# Patient Record
Sex: MALE | Race: WHITE | NOT HISPANIC OR LATINO | URBAN - METROPOLITAN AREA
[De-identification: names, ages, dates, MRNs, and addresses within clinical notes are randomized per-mention and may not be internally consistent; named-entity substitution may affect disease eponyms.]

---

## 2023-04-26 ENCOUNTER — NURSING HOME VISIT (OUTPATIENT)
Dept: POST ACUTE CARE | Facility: EXTERNAL LOCATION | Age: 88
End: 2023-04-26
Payer: MEDICARE

## 2023-04-26 DIAGNOSIS — T14.8XXA HEMATOMA: Primary | ICD-10-CM

## 2023-04-26 DIAGNOSIS — E03.9 HYPOTHYROIDISM, UNSPECIFIED TYPE: ICD-10-CM

## 2023-04-26 DIAGNOSIS — I48.91 ATRIAL FIBRILLATION, UNSPECIFIED TYPE (MULTI): ICD-10-CM

## 2023-04-26 DIAGNOSIS — E87.1 HYPONATREMIA: ICD-10-CM

## 2023-04-26 DIAGNOSIS — M48.061 SPINAL STENOSIS OF LUMBAR REGION WITHOUT NEUROGENIC CLAUDICATION: ICD-10-CM

## 2023-04-26 DIAGNOSIS — S81.802S WOUND OF LEFT LOWER EXTREMITY, SEQUELA: ICD-10-CM

## 2023-04-26 DIAGNOSIS — R53.81 PHYSICAL DECONDITIONING: ICD-10-CM

## 2023-04-26 DIAGNOSIS — Z85.46 HISTORY OF PROSTATE CANCER: ICD-10-CM

## 2023-04-26 DIAGNOSIS — D64.9 ANEMIA, UNSPECIFIED TYPE: ICD-10-CM

## 2023-04-26 PROCEDURE — 99309 SBSQ NF CARE MODERATE MDM 30: CPT | Performed by: NURSE PRACTITIONER

## 2023-04-26 NOTE — LETTER
Patient: Jeffry Yates  : 1933    Encounter Date: 2023    Chief Complaint:   SNF F/U  LLE hematoma  Hyponatremia  Physical deconditioning     HPI:   89 year-old male presenting to the ER on 4/3/23 w/ c/o LLE hematoma s/p fall, generalized weakness, and increased LLE edema. Recently, patient had car door slam into his right leg from the wind. He was noted to have a painful bruise and was seen as an outpatient and started on ATB and wound care. He was admitted for further evaluation and treatment. Hospital course:    LLE hematoma-surgery consult, no surgical intervention indicated  AFib-rate controlled, eliquis held  Generalized weakness-PT/OT recommending acute rehab  Hyponatremia-renal consult, BMP monitored, resolved prior to discharge    Pt. was HDS and discharged to [facility]. Rehab course c/b necrosis and separation of wound. Wound was debrided and wound vac was applied on 23. Pt. was discharged to [facility] on 23 for further PT/OT. Today, patient reports that he is feeling well. He denies any pain. He denies any dizziness, SOB, cough, or chest pain. Staff report no clinical concerns.    ROS:    As above in HPI. Otherwise, all other systems have been reviewed and are negative for complaint.    Medications reviewed and verified in NH chart.     Patient Active Problem List   Diagnosis   • Impaired functional mobility and activity tolerance   • Wound of left leg   • Hematoma   • Spinal stenosis of lumbar region without neurogenic claudication   • History of prostate cancer   • Hx of CABG   • Hypothyroidism   • Atrial fibrillation (CMS/HCC)   • Secondary hypertension        Past Medical History:   Diagnosis Date   • Atherosclerotic heart disease of native coronary artery without angina pectoris     Coronary artery disease without angina pectoris, unspecified vessel or lesion type, unspecified whether native or transplanted heart   • Personal history of other diseases of the  circulatory system     History of hypertension       Past Surgical History:   Procedure Laterality Date   • CATARACT EXTRACTION     • CORONARY ANGIOPLASTY WITH STENT PLACEMENT      x3   • ROTATOR CUFF REPAIR     • TRANSURETHRAL RESECTION OF PROSTATE         Family History   Problem Relation Name Age of Onset   • Leukemia Mother     • Alcohol abuse Father     • Cirrhosis Father     • Stroke Brother     • Hyperlipidemia Brother     • Thyroid disease Brother         Social History     Tobacco Use   Smoking Status Never   Smokeless Tobacco Never   Vaping Use   Vaping Status Not on file       Social History     Substance and Sexual Activity   Alcohol Use Not Currently   • Alcohol/week: 10.0 standard drinks of alcohol   • Types: 10 Glasses of wine per week       Social History     Substance and Sexual Activity   Drug Use Never       Allergies   Allergen Reactions   • Atorvastatin Unknown     Other reaction(s): Intolerance   Myalgias   Tolerates: rosuvastatin    Myalgias   Tolerates: rosuvastatin   • Ticlopidine Unknown     rash        Vital Signs:   136/74-77-18-97.8-97% on RA    Physical Exam:  General: Sitting up in WC in NAD, alert   Head/Face: NCAT, symmetrical  Eyes: PERRLA, no injection, no discharge  ENT: Hearing not impaired, ears without scars or lesions, nasal mucosa and turbinates pink, septum midline, lips pink and moist  Neck: Supple, symmetrical  Respiratory: CTA without adventitious sounds, even and nonlabored without use of accessory muscles, good air exchange  Cardio: RRR without murmur or gallops, normal S1S2, BLE NP edema, pedal pulses 3+/4 bilaterally  Chest/Breast: Symmetrical  GI: BS x 4, normoactive, non-distended, abd round and soft, no masses or tenderness  : No suprapubic tenderness or distention  MSK: Gait not assessed, joints with full ROM without pain or contractures  Skin: Skin warm and dry, no induration, venous stasis changes to BLE, wound vac intact to LLE  Neurologic: Cranial nerves II  through XII intact, superficial touch and pain sensation intact  Psychiatric: Alert to person, place, and time, calm and cooperative     Results/Data:   4/22/23: Hgb 10.9, Hct 33.9  4/20/23: BUN 65, Cr 1.32     Assessment/Plan:    Physical deconditioning 2/2 fall/LLE hematoma-no acute surgical intervention indicated per vascular and general surgery, US negative for DVT, recent at acute rehab at [facility], c/w PT/OT, tylenol prn for mild pain, oxycodone prn for moderate to severe pain, F/U with plastic surgery as scheduled (5/2)  Hyponatremia-monitor BMP closely   CAD s/p CABG (2019)/HTN/HLD/AFib-2 gm Na+ diet, c/w lasix, metoprolol, eliquis, crestor, and spironolactone, monitor BP and HR, monitor lipid panel, F/U with cardiology as scheduled  Hypothyroidism-c/w levothyroxine  Anemia-c/w Vit B12 and MVI, monitor CBC  Lumbar stenosis-c/w supportive care, oxycodone prn for moderate to severe pain  Insomnia-c/w melatonin  Prostate cancer s/p radiation, LUTS, and TURP (2003)-c/w supportive care, F/U with urology as needed    Orders:  NNO    Code Status:   Full Code            Electronically Signed By: PETROS Delarosa-CNP   5/15/23  8:01 PM

## 2023-04-27 ENCOUNTER — NURSING HOME VISIT (OUTPATIENT)
Dept: POST ACUTE CARE | Facility: EXTERNAL LOCATION | Age: 88
End: 2023-04-27
Payer: MEDICARE

## 2023-04-27 DIAGNOSIS — I48.91 ATRIAL FIBRILLATION, UNSPECIFIED TYPE (MULTI): ICD-10-CM

## 2023-04-27 DIAGNOSIS — Z85.46 HISTORY OF PROSTATE CANCER: ICD-10-CM

## 2023-04-27 DIAGNOSIS — T14.8XXA HEMATOMA: ICD-10-CM

## 2023-04-27 DIAGNOSIS — I15.9 SECONDARY HYPERTENSION: ICD-10-CM

## 2023-04-27 DIAGNOSIS — M48.061 SPINAL STENOSIS OF LUMBAR REGION WITHOUT NEUROGENIC CLAUDICATION: ICD-10-CM

## 2023-04-27 DIAGNOSIS — E03.9 HYPOTHYROIDISM, UNSPECIFIED TYPE: ICD-10-CM

## 2023-04-27 DIAGNOSIS — Z74.09 IMPAIRED FUNCTIONAL MOBILITY AND ACTIVITY TOLERANCE: ICD-10-CM

## 2023-04-27 DIAGNOSIS — Z95.1 HX OF CABG: ICD-10-CM

## 2023-04-27 DIAGNOSIS — S81.802S WOUND OF LEFT LOWER EXTREMITY, SEQUELA: ICD-10-CM

## 2023-04-27 LAB
ANION GAP IN SER/PLAS: 11 MMOL/L (ref 10–20)
CALCIUM (MG/DL) IN SER/PLAS: 8.8 MG/DL (ref 8.6–10.3)
CARBON DIOXIDE, TOTAL (MMOL/L) IN SER/PLAS: 29 MMOL/L (ref 21–32)
CHLORIDE (MMOL/L) IN SER/PLAS: 103 MMOL/L (ref 98–107)
CREATININE (MG/DL) IN SER/PLAS: 1.07 MG/DL (ref 0.5–1.3)
ERYTHROCYTE DISTRIBUTION WIDTH (RATIO) BY AUTOMATED COUNT: 16.1 % (ref 11.5–14.5)
ERYTHROCYTE MEAN CORPUSCULAR HEMOGLOBIN CONCENTRATION (G/DL) BY AUTOMATED: 30.2 G/DL (ref 32–36)
ERYTHROCYTE MEAN CORPUSCULAR VOLUME (FL) BY AUTOMATED COUNT: 87 FL (ref 80–100)
ERYTHROCYTES (10*6/UL) IN BLOOD BY AUTOMATED COUNT: 4.35 X10E12/L (ref 4.5–5.9)
GFR MALE: 66 ML/MIN/1.73M2
GLUCOSE (MG/DL) IN SER/PLAS: 101 MG/DL (ref 74–99)
HEMATOCRIT (%) IN BLOOD BY AUTOMATED COUNT: 37.7 % (ref 41–52)
HEMOGLOBIN (G/DL) IN BLOOD: 11.4 G/DL (ref 13.5–17.5)
LEUKOCYTES (10*3/UL) IN BLOOD BY AUTOMATED COUNT: 6.4 X10E9/L (ref 4.4–11.3)
NRBC (PER 100 WBCS) BY AUTOMATED COUNT: 0.3 /100 WBC (ref 0–0)
PLATELETS (10*3/UL) IN BLOOD AUTOMATED COUNT: 233 X10E9/L (ref 150–450)
POTASSIUM (MMOL/L) IN SER/PLAS: 4.2 MMOL/L (ref 3.5–5.3)
SODIUM (MMOL/L) IN SER/PLAS: 139 MMOL/L (ref 136–145)
UREA NITROGEN (MG/DL) IN SER/PLAS: 47 MG/DL (ref 6–23)

## 2023-04-27 PROCEDURE — 99306 1ST NF CARE HIGH MDM 50: CPT | Performed by: INTERNAL MEDICINE

## 2023-04-27 NOTE — LETTER
Patient: Jeffry Yates  : 1933    Encounter Date: 2023    Name: Jeffry Yates  : 1933  MRN: [id number]  Visit Date: 2023  Chief Complaint: HISTORY AND PHYSICAL    HPI: 88 y/o, Full Code, Dx: Recent fall now with LLE hematoma. US -ve for DVT. Had some hyponatremia that Nephro saw him for. PMH of CAD s/p stent, HTN, hypothyroidism, HLD, MGUS, Afib, prostate cancer s/p radiation, LUTS, TURP  with Dr Zhao. Pt was admited to [facility] on  from [facility] for ongoing med mgt and therapy services.     Subjective: Seen and examined today.  Laying in bed. Reviewed hospitalization with him. Reports that he wants to walk more. Reports that therapy is going on. Wound vac is in place on LLE. Denies N/V/D/C/CP. No fever or chills. Reviewed documentation from [facility] admission to hospital and acute rehab stay.     ROS:  As above in subjective. Otherwise, all other systems have been reviewed and are negative for complaint.    Medications:  Medications reviewed and verified in NH chart.     Past Medical/Surgical History:  Past Medical History:   Diagnosis Date   • Atherosclerotic heart disease of native coronary artery without angina pectoris     Coronary artery disease without angina pectoris, unspecified vessel or lesion type, unspecified whether native or transplanted heart   • Personal history of other diseases of the circulatory system     History of hypertension       Past Surgical History:   Procedure Laterality Date   • OTHER SURGICAL HISTORY  10/14/2022    Heart surgery     Social History:  Former Smoker- quit in . Smoked 15 yrs.  Denies current smoking, illicits. Social ETOH Use.     Family History:  Mother had leukemia  Father had ETOH induced cirrhosis  Brother had a stroke, TB, meningitis at age 26, and thyroid issues.     Vital Signs:   Vital Signs were reviewed in nursing home documentation.    Physical Exam:  Physical Exam  Vitals reviewed.   Constitutional:       General: He  is not in acute distress.     Appearance: Normal appearance. He is not ill-appearing.   HENT:      Head: Normocephalic and atraumatic.      Right Ear: Tympanic membrane and external ear normal.      Left Ear: Tympanic membrane and external ear normal.      Nose: Nose normal.      Mouth/Throat:      Mouth: Mucous membranes are moist.      Pharynx: Oropharynx is clear.   Eyes:      Conjunctiva/sclera: Conjunctivae normal.      Pupils: Pupils are equal, round, and reactive to light.   Cardiovascular:      Rate and Rhythm: Normal rate and regular rhythm.      Heart sounds: Normal heart sounds. No murmur heard.  Pulmonary:      Effort: Pulmonary effort is normal. No respiratory distress.      Breath sounds: Normal breath sounds. No wheezing.   Abdominal:      General: There is no distension.      Palpations: Abdomen is soft. There is no mass.      Tenderness: There is no abdominal tenderness.   Musculoskeletal:         General: Normal range of motion.      Cervical back: Normal range of motion and neck supple.   Skin:     General: Skin is warm and dry.      Findings: Bruising, signs of injury and wound present.      Comments: LLE wound with wound vac in place  BLE lymphedema   Neurological:      General: No focal deficit present.      Mental Status: He is alert and oriented to person, place, and time.      Sensory: No sensory deficit.      Motor: No weakness.      Coordination: Coordination normal.      Gait: Gait normal.   Psychiatric:         Mood and Affect: Mood normal.         Behavior: Behavior normal.         Results/Data:   Lab Results   Component Value Date    WBC 6.4 04/27/2023    HGB 11.4 (L) 04/27/2023    HCT 37.7 (L) 04/27/2023     04/27/2023     04/27/2023    K 4.2 04/27/2023     04/27/2023    CREATININE 1.07 04/27/2023    BUN 47 (H) 04/27/2023    CO2 29 04/27/2023     Results were reviewed and addressed accordingly.    Provider Impression:   IFM 2/2 recent fall. LLE hematoma / wound s/p  car door closing on it  #H/o Lymphedema, PVD, LLE Wound  - US -ve for DVT. Reviewed other imaging from [facility].  - had an acute rehab stay at [facility]  - has an appt with Plastic Surgery on 5/2  - per vascular and general surgery, no surgery is needed  - consult PT OT ST to eval and tx    CV- CAD s/p CABG in 2019, HTN, HLD, AFIb  - Cardio appt with Dr Mendoza on 6/21  - c/w metoprolol for rate control  - c/w eliqus for stroke prevention  - c/w statin, cozaar, lasix    Hypothyroidism  - c/w synthroid    H/o Prostate cancer s/p radiation, LUTS, TURP 2003 with Dr Zhao  - supportive care    Moderate Lumbar Stenosis, h/o remote lumbar decompression  - has had a MRI in the past and saw Dr Fajardo whom did not recommend surgery  - supportive care  - PRN pain medication with oxycodone    Constipation Prevention  - c/w stool softeners and laxatives PRN  - monitor bowel movements    Code Status  - Full Code  ----------------  Written by Farzana Armenta LPN, acting as a scribe for Dr. Aviles. This note accurately reflects the work and decisions made by Dr. Aviles.     I, Dr. Aviles, attest all medical record entries made by the scribe were under my direction and were personally dictated by me. I have reviewed the chart and agree that the record accurately reflects my performance of the history, physical exam, and assessment and plan.       Electronically Signed By: Coleman Caballero MD   5/8/23 10:51 AM

## 2023-05-03 PROBLEM — T14.8XXA HEMATOMA: Status: ACTIVE | Noted: 2023-05-03

## 2023-05-03 PROBLEM — S81.802A WOUND OF LEFT LEG: Status: ACTIVE | Noted: 2023-05-03

## 2023-05-03 PROBLEM — M48.061 SPINAL STENOSIS OF LUMBAR REGION WITHOUT NEUROGENIC CLAUDICATION: Status: ACTIVE | Noted: 2023-05-03

## 2023-05-03 PROBLEM — Z74.09 IMPAIRED FUNCTIONAL MOBILITY AND ACTIVITY TOLERANCE: Status: ACTIVE | Noted: 2023-05-03

## 2023-05-03 PROBLEM — I48.91 ATRIAL FIBRILLATION (MULTI): Status: ACTIVE | Noted: 2023-05-03

## 2023-05-03 PROBLEM — E03.9 HYPOTHYROIDISM: Status: ACTIVE | Noted: 2023-05-03

## 2023-05-03 PROBLEM — Z95.1 HX OF CABG: Status: ACTIVE | Noted: 2023-05-03

## 2023-05-03 PROBLEM — I15.9 SECONDARY HYPERTENSION: Status: ACTIVE | Noted: 2023-05-03

## 2023-05-03 PROBLEM — Z85.46 HISTORY OF PROSTATE CANCER: Status: ACTIVE | Noted: 2023-05-03

## 2023-05-03 NOTE — PROGRESS NOTES
Name: Jeffry Yates  : 1933  MRN: [id number]  Visit Date: 2023  Chief Complaint: HISTORY AND PHYSICAL    HPI: 88 y/o, Full Code, Dx: Recent fall now with LLE hematoma. US -ve for DVT. Had some hyponatremia that Nephro saw him for. PMH of CAD s/p stent, HTN, hypothyroidism, HLD, MGUS, Afib, prostate cancer s/p radiation, LUTS, TURP  with Dr Zhao. Pt was admited to [facility] on  from [facility] for ongoing med mgt and therapy services.     Subjective: Seen and examined today.  Laying in bed. Reviewed hospitalization with him. Reports that he wants to walk more. Reports that therapy is going on. Wound vac is in place on LLE. Denies N/V/D/C/CP. No fever or chills. Reviewed documentation from [facility] admission to hospital and acute rehab stay.     ROS:  As above in subjective. Otherwise, all other systems have been reviewed and are negative for complaint.    Medications:  Medications reviewed and verified in NH chart.     Past Medical/Surgical History:  Past Medical History:   Diagnosis Date    Atherosclerotic heart disease of native coronary artery without angina pectoris     Coronary artery disease without angina pectoris, unspecified vessel or lesion type, unspecified whether native or transplanted heart    Personal history of other diseases of the circulatory system     History of hypertension       Past Surgical History:   Procedure Laterality Date    OTHER SURGICAL HISTORY  10/14/2022    Heart surgery     Social History:  Former Smoker- quit in . Smoked 15 yrs.  Denies current smoking, illicits. Social ETOH Use.     Family History:  Mother had leukemia  Father had ETOH induced cirrhosis  Brother had a stroke, TB, meningitis at age 26, and thyroid issues.     Vital Signs:   Vital Signs were reviewed in nursing home documentation.    Physical Exam:  Physical Exam  Vitals reviewed.   Constitutional:       General: He is not in acute distress.     Appearance: Normal appearance. He is not  ill-appearing.   HENT:      Head: Normocephalic and atraumatic.      Right Ear: Tympanic membrane and external ear normal.      Left Ear: Tympanic membrane and external ear normal.      Nose: Nose normal.      Mouth/Throat:      Mouth: Mucous membranes are moist.      Pharynx: Oropharynx is clear.   Eyes:      Conjunctiva/sclera: Conjunctivae normal.      Pupils: Pupils are equal, round, and reactive to light.   Cardiovascular:      Rate and Rhythm: Normal rate and regular rhythm.      Heart sounds: Normal heart sounds. No murmur heard.  Pulmonary:      Effort: Pulmonary effort is normal. No respiratory distress.      Breath sounds: Normal breath sounds. No wheezing.   Abdominal:      General: There is no distension.      Palpations: Abdomen is soft. There is no mass.      Tenderness: There is no abdominal tenderness.   Musculoskeletal:         General: Normal range of motion.      Cervical back: Normal range of motion and neck supple.   Skin:     General: Skin is warm and dry.      Findings: Bruising, signs of injury and wound present.      Comments: LLE wound with wound vac in place  BLE lymphedema   Neurological:      General: No focal deficit present.      Mental Status: He is alert and oriented to person, place, and time.      Sensory: No sensory deficit.      Motor: No weakness.      Coordination: Coordination normal.      Gait: Gait normal.   Psychiatric:         Mood and Affect: Mood normal.         Behavior: Behavior normal.         Results/Data:   Lab Results   Component Value Date    WBC 6.4 04/27/2023    HGB 11.4 (L) 04/27/2023    HCT 37.7 (L) 04/27/2023     04/27/2023     04/27/2023    K 4.2 04/27/2023     04/27/2023    CREATININE 1.07 04/27/2023    BUN 47 (H) 04/27/2023    CO2 29 04/27/2023     Results were reviewed and addressed accordingly.    Provider Impression:   IFM 2/2 recent fall. LLE hematoma / wound s/p car door closing on it  #H/o Lymphedema, PVD, LLE Wound  - US -ve for  DVT. Reviewed other imaging from [facility].  - had an acute rehab stay at [facility]  - has an appt with Plastic Surgery on 5/2  - per vascular and general surgery, no surgery is needed  - consult PT OT ST to eval and tx    CV- CAD s/p CABG in 2019, HTN, HLD, AFIb  - Cardio appt with Dr Mendoza on 6/21  - c/w metoprolol for rate control  - c/w eliqus for stroke prevention  - c/w statin, cozaar, lasix    Hypothyroidism  - c/w synthroid    H/o Prostate cancer s/p radiation, LUTS, TURP 2003 with Dr Zhao  - supportive care    Moderate Lumbar Stenosis, h/o remote lumbar decompression  - has had a MRI in the past and saw Dr Fajardo whom did not recommend surgery  - supportive care  - PRN pain medication with oxycodone    Constipation Prevention  - c/w stool softeners and laxatives PRN  - monitor bowel movements    Code Status  - Full Code  ----------------  Written by Farzana Armenta LPN, acting as a scribe for Dr. Aviles. This note accurately reflects the work and decisions made by Dr. Aviles.     I, Dr. Aviles, attest all medical record entries made by the scribe were under my direction and were personally dictated by me. I have reviewed the chart and agree that the record accurately reflects my performance of the history, physical exam, and assessment and plan.

## 2023-05-04 ENCOUNTER — NURSING HOME VISIT (OUTPATIENT)
Dept: POST ACUTE CARE | Facility: EXTERNAL LOCATION | Age: 88
End: 2023-05-04
Payer: MEDICARE

## 2023-05-04 DIAGNOSIS — Z95.1 HX OF CABG: ICD-10-CM

## 2023-05-04 DIAGNOSIS — Z74.09 IMPAIRED FUNCTIONAL MOBILITY AND ACTIVITY TOLERANCE: ICD-10-CM

## 2023-05-04 DIAGNOSIS — I48.91 ATRIAL FIBRILLATION, UNSPECIFIED TYPE (MULTI): ICD-10-CM

## 2023-05-04 DIAGNOSIS — E03.9 HYPOTHYROIDISM, UNSPECIFIED TYPE: ICD-10-CM

## 2023-05-04 DIAGNOSIS — T14.8XXA HEMATOMA: ICD-10-CM

## 2023-05-04 DIAGNOSIS — I15.9 SECONDARY HYPERTENSION: ICD-10-CM

## 2023-05-04 DIAGNOSIS — M48.061 SPINAL STENOSIS OF LUMBAR REGION WITHOUT NEUROGENIC CLAUDICATION: ICD-10-CM

## 2023-05-04 DIAGNOSIS — S81.802S WOUND OF LEFT LOWER EXTREMITY, SEQUELA: ICD-10-CM

## 2023-05-04 DIAGNOSIS — Z85.46 HISTORY OF PROSTATE CANCER: ICD-10-CM

## 2023-05-04 LAB
ANION GAP IN SER/PLAS: 12 MMOL/L (ref 10–20)
CALCIUM (MG/DL) IN SER/PLAS: 8.2 MG/DL (ref 8.6–10.3)
CARBON DIOXIDE, TOTAL (MMOL/L) IN SER/PLAS: 25 MMOL/L (ref 21–32)
CHLORIDE (MMOL/L) IN SER/PLAS: 104 MMOL/L (ref 98–107)
CREATININE (MG/DL) IN SER/PLAS: 1 MG/DL (ref 0.5–1.3)
ERYTHROCYTE DISTRIBUTION WIDTH (RATIO) BY AUTOMATED COUNT: 16.1 % (ref 11.5–14.5)
ERYTHROCYTE MEAN CORPUSCULAR HEMOGLOBIN CONCENTRATION (G/DL) BY AUTOMATED: 30.3 G/DL (ref 32–36)
ERYTHROCYTE MEAN CORPUSCULAR VOLUME (FL) BY AUTOMATED COUNT: 86 FL (ref 80–100)
ERYTHROCYTES (10*6/UL) IN BLOOD BY AUTOMATED COUNT: 4.32 X10E12/L (ref 4.5–5.9)
GFR MALE: 72 ML/MIN/1.73M2
GLUCOSE (MG/DL) IN SER/PLAS: 90 MG/DL (ref 74–99)
HEMATOCRIT (%) IN BLOOD BY AUTOMATED COUNT: 37.3 % (ref 41–52)
HEMOGLOBIN (G/DL) IN BLOOD: 11.3 G/DL (ref 13.5–17.5)
LEUKOCYTES (10*3/UL) IN BLOOD BY AUTOMATED COUNT: 6.9 X10E9/L (ref 4.4–11.3)
PLATELETS (10*3/UL) IN BLOOD AUTOMATED COUNT: 214 X10E9/L (ref 150–450)
POTASSIUM (MMOL/L) IN SER/PLAS: 4.5 MMOL/L (ref 3.5–5.3)
SODIUM (MMOL/L) IN SER/PLAS: 136 MMOL/L (ref 136–145)
UREA NITROGEN (MG/DL) IN SER/PLAS: 41 MG/DL (ref 6–23)

## 2023-05-04 PROCEDURE — 99309 SBSQ NF CARE MODERATE MDM 30: CPT | Performed by: INTERNAL MEDICINE

## 2023-05-04 NOTE — LETTER
Patient: Jeffry Yates  : 1933    Encounter Date: 2023    Name: Jeffry Yates  : 1933  MRN: [id number]  Visit Date: 2023  Chief Complaint: WEEKLY SNF PHYSICIAN VISIT    HPI: 89 y/o, Full Code, Dx: Recent fall now with LLE hematoma. US -ve for DVT. Had some hyponatremia that Nephro saw him for. PMH of CAD s/p stent, HTN, hypothyroidism, HLD, MGUS, Afib, prostate cancer s/p radiation, LUTS, TURP  with Dr Zhao. Pt was admited to [facility] on  from [facility] for ongoing med mgt and therapy services.     Subjective: Seen and examined today.  Sitting up in chair in his room,, eating some lunch. Reports that he is feeling ok and he is progressing with therapy. Reports that he is discharging home on  and he is looking forward to that. LLE wound vac is in place. Denies N/V/D/C/CP. No fever or chills.     ROS:  As above in subjective. Otherwise, all other systems have been reviewed and are negative for complaint.    Physical Exam:  Physical Exam  Vitals reviewed.   Constitutional:       General: He is not in acute distress.     Appearance: Normal appearance. He is not ill-appearing.   HENT:      Head: Normocephalic and atraumatic.      Right Ear: Tympanic membrane and external ear normal.      Left Ear: Tympanic membrane and external ear normal.      Nose: Nose normal.      Mouth/Throat:      Mouth: Mucous membranes are moist.      Pharynx: Oropharynx is clear.   Eyes:      Conjunctiva/sclera: Conjunctivae normal.      Pupils: Pupils are equal, round, and reactive to light.   Cardiovascular:      Rate and Rhythm: Normal rate and regular rhythm.      Heart sounds: Normal heart sounds. No murmur heard.  Pulmonary:      Effort: Pulmonary effort is normal. No respiratory distress.      Breath sounds: Normal breath sounds. No wheezing.   Abdominal:      General: There is no distension.      Palpations: Abdomen is soft. There is no mass.      Tenderness: There is no abdominal tenderness.    Musculoskeletal:         General: Normal range of motion.      Cervical back: Normal range of motion and neck supple.   Skin:     General: Skin is warm and dry.      Findings: Bruising, signs of injury and wound present.      Comments: LLE wound with wound vac in place  BLE lymphedema   Neurological:      General: No focal deficit present.      Mental Status: He is alert and oriented to person, place, and time.      Sensory: No sensory deficit.      Motor: No weakness.      Coordination: Coordination normal.      Gait: Gait normal.   Psychiatric:         Mood and Affect: Mood normal.         Behavior: Behavior normal.         Results/Data:   Lab Results   Component Value Date    WBC 6.9 05/04/2023    HGB 11.3 (L) 05/04/2023    HCT 37.3 (L) 05/04/2023     05/04/2023     05/04/2023    K 4.5 05/04/2023     05/04/2023    CREATININE 1.00 05/04/2023    BUN 41 (H) 05/04/2023    CO2 25 05/04/2023     Results were reviewed and addressed accordingly.    Provider Impression:   IFM 2/2 recent fall. LLE hematoma / wound s/p car door closing on it  #H/o Lymphedema, PVD, LLE Wound  - US -ve for DVT. Reviewed other imaging from [facility].  - had an acute rehab stay at [facility]  - had an appt with Plastic Surgery on 5/2, reports that it went well. Wound vac is to remain in place for now.   - per vascular and general surgery, no surgery is needed  - consulted PT OT ST to eval and tx    CV- CAD s/p CABG in 2019, HTN, HLD, AFIb  - Cardio appt with Dr Mendoza on 6/21  - c/w metoprolol for rate control  - c/w eliqus for stroke prevention  - c/w statin, cozaar, lasix  - 1500mL fluid restriction    Hypothyroidism  - c/w synthroid    H/o Prostate cancer s/p radiation, LUTS, TURP 2003 with Dr Zhao  - supportive care    Moderate Lumbar Stenosis, h/o remote lumbar decompression  - has had a MRI in the past and saw Dr Fajardo whom did not recommend surgery  - supportive care  - PRN pain medication with oxycodone    Constipation  Prevention  - c/w stool softeners and laxatives PRN  - monitor bowel movements    Code Status  - Full Code    Disposition  - plan to discharge home on 5/6 per pt.     ----------------  Written by Farzana Armenta LPN, acting as a scribe for Dr. Aviles. This note accurately reflects the work and decisions made by Dr. Aviles.     I, Dr. Aviles, attest all medical record entries made by the scribe were under my direction and were personally dictated by me. I have reviewed the chart and agree that the record accurately reflects my performance of the history, physical exam, and assessment and plan.       Electronically Signed By: Coleman Caballero MD   5/10/23  1:52 PM

## 2023-05-08 NOTE — PROGRESS NOTES
Name: Jeffry Yates  : 1933  MRN: [id number]  Visit Date: 2023  Chief Complaint: WEEKLY SNF PHYSICIAN VISIT    HPI: 89 y/o, Full Code, Dx: Recent fall now with LLE hematoma. US -ve for DVT. Had some hyponatremia that Nephro saw him for. PMH of CAD s/p stent, HTN, hypothyroidism, HLD, MGUS, Afib, prostate cancer s/p radiation, LUTS, TURP  with Dr Zhao. Pt was admited to [facility] on  from [facility] for ongoing med mgt and therapy services.     Subjective: Seen and examined today.  Sitting up in chair in his room,, eating some lunch. Reports that he is feeling ok and he is progressing with therapy. Reports that he is discharging home on  and he is looking forward to that. LLE wound vac is in place. Denies N/V/D/C/CP. No fever or chills.     ROS:  As above in subjective. Otherwise, all other systems have been reviewed and are negative for complaint.    Physical Exam:  Physical Exam  Vitals reviewed.   Constitutional:       General: He is not in acute distress.     Appearance: Normal appearance. He is not ill-appearing.   HENT:      Head: Normocephalic and atraumatic.      Right Ear: Tympanic membrane and external ear normal.      Left Ear: Tympanic membrane and external ear normal.      Nose: Nose normal.      Mouth/Throat:      Mouth: Mucous membranes are moist.      Pharynx: Oropharynx is clear.   Eyes:      Conjunctiva/sclera: Conjunctivae normal.      Pupils: Pupils are equal, round, and reactive to light.   Cardiovascular:      Rate and Rhythm: Normal rate and regular rhythm.      Heart sounds: Normal heart sounds. No murmur heard.  Pulmonary:      Effort: Pulmonary effort is normal. No respiratory distress.      Breath sounds: Normal breath sounds. No wheezing.   Abdominal:      General: There is no distension.      Palpations: Abdomen is soft. There is no mass.      Tenderness: There is no abdominal tenderness.   Musculoskeletal:         General: Normal range of motion.      Cervical  back: Normal range of motion and neck supple.   Skin:     General: Skin is warm and dry.      Findings: Bruising, signs of injury and wound present.      Comments: LLE wound with wound vac in place  BLE lymphedema   Neurological:      General: No focal deficit present.      Mental Status: He is alert and oriented to person, place, and time.      Sensory: No sensory deficit.      Motor: No weakness.      Coordination: Coordination normal.      Gait: Gait normal.   Psychiatric:         Mood and Affect: Mood normal.         Behavior: Behavior normal.         Results/Data:   Lab Results   Component Value Date    WBC 6.9 05/04/2023    HGB 11.3 (L) 05/04/2023    HCT 37.3 (L) 05/04/2023     05/04/2023     05/04/2023    K 4.5 05/04/2023     05/04/2023    CREATININE 1.00 05/04/2023    BUN 41 (H) 05/04/2023    CO2 25 05/04/2023     Results were reviewed and addressed accordingly.    Provider Impression:   IFM 2/2 recent fall. LLE hematoma / wound s/p car door closing on it  #H/o Lymphedema, PVD, LLE Wound  - US -ve for DVT. Reviewed other imaging from [facility].  - had an acute rehab stay at [facility]  - had an appt with Plastic Surgery on 5/2, reports that it went well. Wound vac is to remain in place for now.   - per vascular and general surgery, no surgery is needed  - consulted PT OT ST to eval and tx    CV- CAD s/p CABG in 2019, HTN, HLD, AFIb  - Cardio appt with Dr Mendoza on 6/21  - c/w metoprolol for rate control  - c/w eliqus for stroke prevention  - c/w statin, cozaar, lasix  - 1500mL fluid restriction    Hypothyroidism  - c/w synthroid    H/o Prostate cancer s/p radiation, LUTS, TURP 2003 with Dr Zhao  - supportive care    Moderate Lumbar Stenosis, h/o remote lumbar decompression  - has had a MRI in the past and saw Dr Fajardo whom did not recommend surgery  - supportive care  - PRN pain medication with oxycodone    Constipation Prevention  - c/w stool softeners and laxatives PRN  - monitor bowel  movements    Code Status  - Full Code    Disposition  - plan to discharge home on 5/6 per pt.     ----------------  Written by Farzana Armenta LPN, acting as a scribe for Dr. Aviles. This note accurately reflects the work and decisions made by Dr. Aviles.     I, Dr. Aviles, attest all medical record entries made by the scribe were under my direction and were personally dictated by me. I have reviewed the chart and agree that the record accurately reflects my performance of the history, physical exam, and assessment and plan.

## 2023-05-15 NOTE — PROGRESS NOTES
Chief Complaint:   SNF F/U  LLE hematoma  Hyponatremia  Physical deconditioning     HPI:   89 year-old male presenting to the ER on 4/3/23 w/ c/o LLE hematoma s/p fall, generalized weakness, and increased LLE edema. Recently, patient had car door slam into his right leg from the wind. He was noted to have a painful bruise and was seen as an outpatient and started on ATB and wound care. He was admitted for further evaluation and treatment. Hospital course:    LLE hematoma-surgery consult, no surgical intervention indicated  AFib-rate controlled, eliquis held  Generalized weakness-PT/OT recommending acute rehab  Hyponatremia-renal consult, BMP monitored, resolved prior to discharge    Pt. was HDS and discharged to [facility]. Rehab course c/b necrosis and separation of wound. Wound was debrided and wound vac was applied on 4/17/23. Pt. was discharged to [facility] on 4/25/23 for further PT/OT. Today, patient reports that he is feeling well. He denies any pain. He denies any dizziness, SOB, cough, or chest pain. Staff report no clinical concerns.    ROS:    As above in HPI. Otherwise, all other systems have been reviewed and are negative for complaint.    Medications reviewed and verified in NH chart.     Patient Active Problem List   Diagnosis    Impaired functional mobility and activity tolerance    Wound of left leg    Hematoma    Spinal stenosis of lumbar region without neurogenic claudication    History of prostate cancer    Hx of CABG    Hypothyroidism    Atrial fibrillation (CMS/HCC)    Secondary hypertension        Past Medical History:   Diagnosis Date    Atherosclerotic heart disease of native coronary artery without angina pectoris     Coronary artery disease without angina pectoris, unspecified vessel or lesion type, unspecified whether native or transplanted heart    Personal history of other diseases of the circulatory system     History of hypertension       Past Surgical History:   Procedure  Laterality Date    CATARACT EXTRACTION      CORONARY ANGIOPLASTY WITH STENT PLACEMENT      x3    ROTATOR CUFF REPAIR      TRANSURETHRAL RESECTION OF PROSTATE         Family History   Problem Relation Name Age of Onset    Leukemia Mother      Alcohol abuse Father      Cirrhosis Father      Stroke Brother      Hyperlipidemia Brother      Thyroid disease Brother         Social History     Tobacco Use   Smoking Status Never   Smokeless Tobacco Never   Vaping Use   Vaping Status Not on file       Social History     Substance and Sexual Activity   Alcohol Use Not Currently    Alcohol/week: 10.0 standard drinks of alcohol    Types: 10 Glasses of wine per week       Social History     Substance and Sexual Activity   Drug Use Never       Allergies   Allergen Reactions    Atorvastatin Unknown     Other reaction(s): Intolerance   Myalgias   Tolerates: rosuvastatin    Myalgias   Tolerates: rosuvastatin    Ticlopidine Unknown     rash        Vital Signs:   136/74-77-18-97.8-97% on RA    Physical Exam:  General: Sitting up in WC in NAD, alert   Head/Face: NCAT, symmetrical  Eyes: PERRLA, no injection, no discharge  ENT: Hearing not impaired, ears without scars or lesions, nasal mucosa and turbinates pink, septum midline, lips pink and moist  Neck: Supple, symmetrical  Respiratory: CTA without adventitious sounds, even and nonlabored without use of accessory muscles, good air exchange  Cardio: RRR without murmur or gallops, normal S1S2, BLE NP edema, pedal pulses 3+/4 bilaterally  Chest/Breast: Symmetrical  GI: BS x 4, normoactive, non-distended, abd round and soft, no masses or tenderness  : No suprapubic tenderness or distention  MSK: Gait not assessed, joints with full ROM without pain or contractures  Skin: Skin warm and dry, no induration, venous stasis changes to BLE, wound vac intact to LLE  Neurologic: Cranial nerves II through XII intact, superficial touch and pain sensation intact  Psychiatric: Alert to person, place,  and time, calm and cooperative     Results/Data:   4/22/23: Hgb 10.9, Hct 33.9  4/20/23: BUN 65, Cr 1.32     Assessment/Plan:    Physical deconditioning 2/2 fall/LLE hematoma-no acute surgical intervention indicated per vascular and general surgery, US negative for DVT, recent at acute rehab at [facility], c/w PT/OT, tylenol prn for mild pain, oxycodone prn for moderate to severe pain, F/U with plastic surgery as scheduled (5/2)  Hyponatremia-monitor BMP closely   CAD s/p CABG (2019)/HTN/HLD/AFib-2 gm Na+ diet, c/w lasix, metoprolol, eliquis, crestor, and spironolactone, monitor BP and HR, monitor lipid panel, F/U with cardiology as scheduled  Hypothyroidism-c/w levothyroxine  Anemia-c/w Vit B12 and MVI, monitor CBC  Lumbar stenosis-c/w supportive care, oxycodone prn for moderate to severe pain  Insomnia-c/w melatonin  Prostate cancer s/p radiation, LUTS, and TURP (2003)-c/w supportive care, F/U with urology as needed    Orders:  NNO    Code Status:   Full Code